# Patient Record
(demographics unavailable — no encounter records)

---

## 2025-01-23 NOTE — HISTORY OF PRESENT ILLNESS
[FreeTextEntry1] : Please refer to NP note below.   59-year-old female with PMH of HTN, HLD, presents for evaluation of syncope. Pt reports 1 episode of syncope on 1/6/25, when she was at work after lunch. She felt headache, B/L UE numbness and neck pain. She recalled the syncope lasted for a few minutes, with bowel incontinence. She awoke with blurred vision but no confusions. Then she attended [facility] at night for checkup, had done BT, CXR, CT brain and MRI brain without significant findings. She was given Meclizine upon discharge. She still feels headache. Pt reports occasional palpitations noted at night, described as irregular beat, lasting for a few minutes. Pt was given beta blocker but LE swelling noted and stopped. Patient denies CP or SOB.  Pt is on Losartan 25mg, Amlodipine 5mg.  Today's /80 P 73.  Patient was not orthostatic (-120-120).  Exam showed clear lungs, regular rhythm, and no LE edema.  ECG showed NSR, nonspecific STTw changes.  I advised patient to undergo an echocardiogram.  I advised patient to wear a 7-day event monitor.  I advised patient to followup with neurology.  I advised patient to increase fluid intake.  Her syncope may be due to low BP in the setting of viral syndrome and insufficient fluid intake.